# Patient Record
Sex: FEMALE | Race: OTHER | HISPANIC OR LATINO | Employment: FULL TIME | URBAN - METROPOLITAN AREA
[De-identification: names, ages, dates, MRNs, and addresses within clinical notes are randomized per-mention and may not be internally consistent; named-entity substitution may affect disease eponyms.]

---

## 2023-10-19 ENCOUNTER — HOSPITAL ENCOUNTER (EMERGENCY)
Facility: HOSPITAL | Age: 33
Discharge: HOME | End: 2023-10-20
Attending: EMERGENCY MEDICINE
Payer: COMMERCIAL

## 2023-10-19 DIAGNOSIS — R11.2 NAUSEA AND VOMITING, UNSPECIFIED VOMITING TYPE: Primary | ICD-10-CM

## 2023-10-19 DIAGNOSIS — R11.10 VOMITING AND DIARRHEA: ICD-10-CM

## 2023-10-19 DIAGNOSIS — R10.84 GENERALIZED ABDOMINAL PAIN: ICD-10-CM

## 2023-10-19 DIAGNOSIS — R19.7 VOMITING AND DIARRHEA: ICD-10-CM

## 2023-10-19 LAB
ALBUMIN SERPL BCP-MCNC: 4.4 G/DL (ref 3.4–5)
ALP SERPL-CCNC: 69 U/L (ref 33–110)
ALT SERPL W P-5'-P-CCNC: 11 U/L (ref 7–45)
ANION GAP SERPL CALC-SCNC: 12 MMOL/L (ref 10–20)
APPEARANCE UR: ABNORMAL
AST SERPL W P-5'-P-CCNC: 16 U/L (ref 9–39)
BACTERIA #/AREA URNS AUTO: ABNORMAL /HPF
BASOPHILS # BLD AUTO: 0.01 X10*3/UL (ref 0–0.1)
BASOPHILS NFR BLD AUTO: 0.2 %
BILIRUB SERPL-MCNC: 0.9 MG/DL (ref 0–1.2)
BILIRUB UR STRIP.AUTO-MCNC: NEGATIVE MG/DL
BUN SERPL-MCNC: 10 MG/DL (ref 6–23)
CALCIUM SERPL-MCNC: 10.3 MG/DL (ref 8.6–10.3)
CHLORIDE SERPL-SCNC: 107 MMOL/L (ref 98–107)
CO2 SERPL-SCNC: 21 MMOL/L (ref 21–32)
COLOR UR: YELLOW
CREAT SERPL-MCNC: 0.63 MG/DL (ref 0.5–1.05)
EOSINOPHIL # BLD AUTO: 0 X10*3/UL (ref 0–0.7)
EOSINOPHIL NFR BLD AUTO: 0 %
ERYTHROCYTE [DISTWIDTH] IN BLOOD BY AUTOMATED COUNT: 11.6 % (ref 11.5–14.5)
GFR SERPL CREATININE-BSD FRML MDRD: >90 ML/MIN/1.73M*2
GLUCOSE SERPL-MCNC: 112 MG/DL (ref 74–99)
GLUCOSE UR STRIP.AUTO-MCNC: NEGATIVE MG/DL
HCT VFR BLD AUTO: 40.8 % (ref 36–46)
HGB BLD-MCNC: 14.7 G/DL (ref 12–16)
IMM GRANULOCYTES # BLD AUTO: 0.02 X10*3/UL (ref 0–0.7)
IMM GRANULOCYTES NFR BLD AUTO: 0.3 % (ref 0–0.9)
KETONES UR STRIP.AUTO-MCNC: ABNORMAL MG/DL
LEUKOCYTE ESTERASE UR QL STRIP.AUTO: NEGATIVE
LIPASE SERPL-CCNC: 6 U/L (ref 9–82)
LYMPHOCYTES # BLD AUTO: 0.6 X10*3/UL (ref 1.2–4.8)
LYMPHOCYTES NFR BLD AUTO: 10.1 %
MCH RBC QN AUTO: 32.9 PG (ref 26–34)
MCHC RBC AUTO-ENTMCNC: 36 G/DL (ref 32–36)
MCV RBC AUTO: 91 FL (ref 80–100)
MONOCYTES # BLD AUTO: 0.25 X10*3/UL (ref 0.1–1)
MONOCYTES NFR BLD AUTO: 4.2 %
MUCOUS THREADS #/AREA URNS AUTO: ABNORMAL /LPF
NEUTROPHILS # BLD AUTO: 5.07 X10*3/UL (ref 1.2–7.7)
NEUTROPHILS NFR BLD AUTO: 85.2 %
NITRITE UR QL STRIP.AUTO: NEGATIVE
NRBC BLD-RTO: 0 /100 WBCS (ref 0–0)
PH UR STRIP.AUTO: 7 [PH]
PLATELET # BLD AUTO: 328 X10*3/UL (ref 150–450)
PMV BLD AUTO: 9.9 FL (ref 7.5–11.5)
POTASSIUM SERPL-SCNC: 4.1 MMOL/L (ref 3.5–5.3)
PROT SERPL-MCNC: 8.2 G/DL (ref 6.4–8.2)
PROT UR STRIP.AUTO-MCNC: ABNORMAL MG/DL
RBC # BLD AUTO: 4.47 X10*6/UL (ref 4–5.2)
RBC # UR STRIP.AUTO: NEGATIVE /UL
RBC #/AREA URNS AUTO: ABNORMAL /HPF
SODIUM SERPL-SCNC: 136 MMOL/L (ref 136–145)
SP GR UR STRIP.AUTO: 1.02
SQUAMOUS #/AREA URNS AUTO: ABNORMAL /HPF
UROBILINOGEN UR STRIP.AUTO-MCNC: 4 MG/DL
WBC # BLD AUTO: 6 X10*3/UL (ref 4.4–11.3)
WBC #/AREA URNS AUTO: ABNORMAL /HPF

## 2023-10-19 PROCEDURE — 81001 URINALYSIS AUTO W/SCOPE: CPT | Performed by: EMERGENCY MEDICINE

## 2023-10-19 PROCEDURE — 83690 ASSAY OF LIPASE: CPT | Performed by: EMERGENCY MEDICINE

## 2023-10-19 PROCEDURE — 85025 COMPLETE CBC W/AUTO DIFF WBC: CPT | Performed by: EMERGENCY MEDICINE

## 2023-10-19 PROCEDURE — 96374 THER/PROPH/DIAG INJ IV PUSH: CPT

## 2023-10-19 PROCEDURE — 36415 COLL VENOUS BLD VENIPUNCTURE: CPT | Performed by: EMERGENCY MEDICINE

## 2023-10-19 PROCEDURE — 2500000004 HC RX 250 GENERAL PHARMACY W/ HCPCS (ALT 636 FOR OP/ED): Performed by: EMERGENCY MEDICINE

## 2023-10-19 PROCEDURE — 80053 COMPREHEN METABOLIC PANEL: CPT | Performed by: EMERGENCY MEDICINE

## 2023-10-19 PROCEDURE — 99284 EMERGENCY DEPT VISIT MOD MDM: CPT | Performed by: EMERGENCY MEDICINE

## 2023-10-19 RX ORDER — MORPHINE SULFATE 4 MG/ML
4 INJECTION, SOLUTION INTRAMUSCULAR; INTRAVENOUS ONCE
Status: COMPLETED | OUTPATIENT
Start: 2023-10-19 | End: 2023-10-19

## 2023-10-19 RX ORDER — ONDANSETRON HYDROCHLORIDE 2 MG/ML
4 INJECTION, SOLUTION INTRAVENOUS ONCE
Status: COMPLETED | OUTPATIENT
Start: 2023-10-19 | End: 2023-10-19

## 2023-10-19 RX ADMIN — ONDANSETRON 4 MG: 2 INJECTION INTRAMUSCULAR; INTRAVENOUS at 23:09

## 2023-10-19 ASSESSMENT — COLUMBIA-SUICIDE SEVERITY RATING SCALE - C-SSRS
1. IN THE PAST MONTH, HAVE YOU WISHED YOU WERE DEAD OR WISHED YOU COULD GO TO SLEEP AND NOT WAKE UP?: NO
6. HAVE YOU EVER DONE ANYTHING, STARTED TO DO ANYTHING, OR PREPARED TO DO ANYTHING TO END YOUR LIFE?: NO
2. HAVE YOU ACTUALLY HAD ANY THOUGHTS OF KILLING YOURSELF?: NO

## 2023-10-20 VITALS
BODY MASS INDEX: 31.22 KG/M2 | RESPIRATION RATE: 18 BRPM | SYSTOLIC BLOOD PRESSURE: 133 MMHG | WEIGHT: 159 LBS | TEMPERATURE: 98.5 F | HEIGHT: 60 IN | OXYGEN SATURATION: 96 % | DIASTOLIC BLOOD PRESSURE: 97 MMHG | HEART RATE: 58 BPM

## 2023-10-20 LAB — HOLD SPECIMEN: NORMAL

## 2023-10-20 PROCEDURE — 2500000004 HC RX 250 GENERAL PHARMACY W/ HCPCS (ALT 636 FOR OP/ED): Performed by: EMERGENCY MEDICINE

## 2023-10-20 PROCEDURE — 96375 TX/PRO/DX INJ NEW DRUG ADDON: CPT

## 2023-10-20 PROCEDURE — 96361 HYDRATE IV INFUSION ADD-ON: CPT

## 2023-10-20 PROCEDURE — 2500000004 HC RX 250 GENERAL PHARMACY W/ HCPCS (ALT 636 FOR OP/ED)

## 2023-10-20 PROCEDURE — 96372 THER/PROPH/DIAG INJ SC/IM: CPT

## 2023-10-20 PROCEDURE — 96376 TX/PRO/DX INJ SAME DRUG ADON: CPT

## 2023-10-20 RX ORDER — ONDANSETRON HYDROCHLORIDE 2 MG/ML
4 INJECTION, SOLUTION INTRAVENOUS ONCE
Status: COMPLETED | OUTPATIENT
Start: 2023-10-20 | End: 2023-10-20

## 2023-10-20 RX ORDER — DICYCLOMINE HYDROCHLORIDE 20 MG/1
20 TABLET ORAL 4 TIMES DAILY PRN
Qty: 20 TABLET | Refills: 0 | Status: SHIPPED | OUTPATIENT
Start: 2023-10-20 | End: 2024-04-22 | Stop reason: ALTCHOICE

## 2023-10-20 RX ORDER — ONDANSETRON 4 MG/1
4 TABLET, ORALLY DISINTEGRATING ORAL EVERY 8 HOURS PRN
Qty: 20 TABLET | Refills: 0 | Status: SHIPPED | OUTPATIENT
Start: 2023-10-20 | End: 2023-10-27

## 2023-10-20 RX ORDER — MORPHINE SULFATE 4 MG/ML
INJECTION INTRAVENOUS
Status: COMPLETED
Start: 2023-10-20 | End: 2023-10-20

## 2023-10-20 RX ORDER — DICYCLOMINE HYDROCHLORIDE 10 MG/ML
20 INJECTION INTRAMUSCULAR ONCE
Status: COMPLETED | OUTPATIENT
Start: 2023-10-20 | End: 2023-10-20

## 2023-10-20 RX ADMIN — SODIUM CHLORIDE, POTASSIUM CHLORIDE, SODIUM LACTATE AND CALCIUM CHLORIDE 1000 ML: 600; 310; 30; 20 INJECTION, SOLUTION INTRAVENOUS at 00:21

## 2023-10-20 RX ADMIN — ONDANSETRON 4 MG: 2 INJECTION INTRAMUSCULAR; INTRAVENOUS at 02:18

## 2023-10-20 RX ADMIN — MORPHINE SULFATE 4 MG: 4 INJECTION INTRAVENOUS at 00:17

## 2023-10-20 RX ADMIN — DICYCLOMINE HYDROCHLORIDE 20 MG: 10 INJECTION, SOLUTION INTRAMUSCULAR at 02:18

## 2023-10-20 ASSESSMENT — PAIN SCALES - GENERAL: PAINLEVEL_OUTOF10: 8

## 2023-10-21 ENCOUNTER — HOSPITAL ENCOUNTER (EMERGENCY)
Facility: HOSPITAL | Age: 33
Discharge: HOME | End: 2023-10-21
Attending: EMERGENCY MEDICINE
Payer: COMMERCIAL

## 2023-10-21 ENCOUNTER — HOSPITAL ENCOUNTER (EMERGENCY)
Facility: HOSPITAL | Age: 33
Discharge: HOME | End: 2023-10-21
Payer: COMMERCIAL

## 2023-10-21 VITALS
DIASTOLIC BLOOD PRESSURE: 80 MMHG | HEIGHT: 60 IN | OXYGEN SATURATION: 98 % | RESPIRATION RATE: 20 BRPM | BODY MASS INDEX: 31.41 KG/M2 | WEIGHT: 160 LBS | TEMPERATURE: 98.7 F | HEART RATE: 78 BPM | SYSTOLIC BLOOD PRESSURE: 140 MMHG

## 2023-10-21 VITALS
TEMPERATURE: 97.6 F | DIASTOLIC BLOOD PRESSURE: 81 MMHG | SYSTOLIC BLOOD PRESSURE: 150 MMHG | BODY MASS INDEX: 30.43 KG/M2 | WEIGHT: 155 LBS | HEART RATE: 82 BPM | RESPIRATION RATE: 20 BRPM | OXYGEN SATURATION: 100 % | HEIGHT: 60 IN

## 2023-10-21 DIAGNOSIS — A08.4 VIRAL GASTROENTERITIS: Primary | ICD-10-CM

## 2023-10-21 LAB
ALBUMIN SERPL BCP-MCNC: 4.6 G/DL (ref 3.4–5)
ALP SERPL-CCNC: 65 U/L (ref 33–110)
ALT SERPL W P-5'-P-CCNC: 13 U/L (ref 7–45)
ANION GAP SERPL CALC-SCNC: 15 MMOL/L (ref 10–20)
APPEARANCE UR: ABNORMAL
AST SERPL W P-5'-P-CCNC: 20 U/L (ref 9–39)
BASOPHILS # BLD AUTO: 0.01 X10*3/UL (ref 0–0.1)
BASOPHILS NFR BLD AUTO: 0.1 %
BILIRUB SERPL-MCNC: 1.1 MG/DL (ref 0–1.2)
BILIRUB UR STRIP.AUTO-MCNC: ABNORMAL MG/DL
BUN SERPL-MCNC: 16 MG/DL (ref 6–23)
CALCIUM SERPL-MCNC: 9.9 MG/DL (ref 8.6–10.3)
CHLORIDE SERPL-SCNC: 106 MMOL/L (ref 98–107)
CO2 SERPL-SCNC: 21 MMOL/L (ref 21–32)
COLOR UR: ABNORMAL
CREAT SERPL-MCNC: 0.74 MG/DL (ref 0.5–1.05)
EOSINOPHIL # BLD AUTO: 0 X10*3/UL (ref 0–0.7)
EOSINOPHIL NFR BLD AUTO: 0 %
ERYTHROCYTE [DISTWIDTH] IN BLOOD BY AUTOMATED COUNT: 11.7 % (ref 11.5–14.5)
GFR SERPL CREATININE-BSD FRML MDRD: >90 ML/MIN/1.73M*2
GLUCOSE SERPL-MCNC: 122 MG/DL (ref 74–99)
GLUCOSE UR STRIP.AUTO-MCNC: NEGATIVE MG/DL
HCG UR QL IA.RAPID: NEGATIVE
HCT VFR BLD AUTO: 41.7 % (ref 36–46)
HGB BLD-MCNC: 15.2 G/DL (ref 12–16)
HOLD SPECIMEN: 293
IMM GRANULOCYTES # BLD AUTO: 0.02 X10*3/UL (ref 0–0.7)
IMM GRANULOCYTES NFR BLD AUTO: 0.2 % (ref 0–0.9)
KETONES UR STRIP.AUTO-MCNC: ABNORMAL MG/DL
LEUKOCYTE ESTERASE UR QL STRIP.AUTO: NEGATIVE
LIPASE SERPL-CCNC: 3 U/L (ref 9–82)
LYMPHOCYTES # BLD AUTO: 0.71 X10*3/UL (ref 1.2–4.8)
LYMPHOCYTES NFR BLD AUTO: 8.5 %
MCH RBC QN AUTO: 32.5 PG (ref 26–34)
MCHC RBC AUTO-ENTMCNC: 36.5 G/DL (ref 32–36)
MCV RBC AUTO: 89 FL (ref 80–100)
MONOCYTES # BLD AUTO: 0.25 X10*3/UL (ref 0.1–1)
MONOCYTES NFR BLD AUTO: 3 %
MUCOUS THREADS #/AREA URNS AUTO: NORMAL /LPF
NEUTROPHILS # BLD AUTO: 7.32 X10*3/UL (ref 1.2–7.7)
NEUTROPHILS NFR BLD AUTO: 88.2 %
NITRITE UR QL STRIP.AUTO: NEGATIVE
NRBC BLD-RTO: 0 /100 WBCS (ref 0–0)
PH UR STRIP.AUTO: 8 [PH]
PLATELET # BLD AUTO: 364 X10*3/UL (ref 150–450)
PMV BLD AUTO: 10.1 FL (ref 7.5–11.5)
POTASSIUM SERPL-SCNC: 3.6 MMOL/L (ref 3.5–5.3)
PROT SERPL-MCNC: 8.6 G/DL (ref 6.4–8.2)
PROT UR STRIP.AUTO-MCNC: ABNORMAL MG/DL
RBC # BLD AUTO: 4.67 X10*6/UL (ref 4–5.2)
RBC # UR STRIP.AUTO: NEGATIVE /UL
RBC #/AREA URNS AUTO: NORMAL /HPF
SODIUM SERPL-SCNC: 138 MMOL/L (ref 136–145)
SP GR UR STRIP.AUTO: 1.03
SQUAMOUS #/AREA URNS AUTO: NORMAL /HPF
UROBILINOGEN UR STRIP.AUTO-MCNC: 4 MG/DL
WBC # BLD AUTO: 8.3 X10*3/UL (ref 4.4–11.3)
WBC #/AREA URNS AUTO: NORMAL /HPF

## 2023-10-21 PROCEDURE — 83690 ASSAY OF LIPASE: CPT | Performed by: EMERGENCY MEDICINE

## 2023-10-21 PROCEDURE — 99281 EMR DPT VST MAYX REQ PHY/QHP: CPT

## 2023-10-21 PROCEDURE — 96374 THER/PROPH/DIAG INJ IV PUSH: CPT

## 2023-10-21 PROCEDURE — 36415 COLL VENOUS BLD VENIPUNCTURE: CPT | Performed by: EMERGENCY MEDICINE

## 2023-10-21 PROCEDURE — 96372 THER/PROPH/DIAG INJ SC/IM: CPT

## 2023-10-21 PROCEDURE — 80053 COMPREHEN METABOLIC PANEL: CPT | Performed by: EMERGENCY MEDICINE

## 2023-10-21 PROCEDURE — 4500999001 HC ED NO CHARGE

## 2023-10-21 PROCEDURE — 2500000004 HC RX 250 GENERAL PHARMACY W/ HCPCS (ALT 636 FOR OP/ED): Performed by: EMERGENCY MEDICINE

## 2023-10-21 PROCEDURE — 2500000001 HC RX 250 WO HCPCS SELF ADMINISTERED DRUGS (ALT 637 FOR MEDICARE OP): Performed by: EMERGENCY MEDICINE

## 2023-10-21 PROCEDURE — 81025 URINE PREGNANCY TEST: CPT | Performed by: EMERGENCY MEDICINE

## 2023-10-21 PROCEDURE — 85025 COMPLETE CBC W/AUTO DIFF WBC: CPT | Performed by: EMERGENCY MEDICINE

## 2023-10-21 PROCEDURE — 99284 EMERGENCY DEPT VISIT MOD MDM: CPT | Performed by: EMERGENCY MEDICINE

## 2023-10-21 PROCEDURE — 81003 URINALYSIS AUTO W/O SCOPE: CPT | Performed by: EMERGENCY MEDICINE

## 2023-10-21 RX ORDER — LIDOCAINE HYDROCHLORIDE 20 MG/ML
1.25 SOLUTION OROPHARYNGEAL ONCE
Status: COMPLETED | OUTPATIENT
Start: 2023-10-21 | End: 2023-10-21

## 2023-10-21 RX ORDER — ONDANSETRON HYDROCHLORIDE 2 MG/ML
4 INJECTION, SOLUTION INTRAVENOUS ONCE
Status: COMPLETED | OUTPATIENT
Start: 2023-10-21 | End: 2023-10-21

## 2023-10-21 RX ORDER — DICYCLOMINE HYDROCHLORIDE 10 MG/ML
20 INJECTION INTRAMUSCULAR ONCE
Status: COMPLETED | OUTPATIENT
Start: 2023-10-21 | End: 2023-10-21

## 2023-10-21 RX ADMIN — SODIUM CHLORIDE, POTASSIUM CHLORIDE, SODIUM LACTATE AND CALCIUM CHLORIDE 1000 ML: 600; 310; 30; 20 INJECTION, SOLUTION INTRAVENOUS at 08:55

## 2023-10-21 RX ADMIN — LIDOCAINE HYDROCHLORIDE 1.25 ML: 20 SOLUTION ORAL at 08:55

## 2023-10-21 RX ADMIN — DICYCLOMINE HYDROCHLORIDE 20 MG: 10 INJECTION, SOLUTION INTRAMUSCULAR at 08:56

## 2023-10-21 RX ADMIN — ONDANSETRON 4 MG: 2 INJECTION INTRAMUSCULAR; INTRAVENOUS at 08:57

## 2023-10-21 ASSESSMENT — PAIN DESCRIPTION - DESCRIPTORS
DESCRIPTORS: DULL
DESCRIPTORS: SHARP;PRESSURE;SHOOTING
DESCRIPTORS: DULL
DESCRIPTORS: SHARP;SHOOTING

## 2023-10-21 ASSESSMENT — COLUMBIA-SUICIDE SEVERITY RATING SCALE - C-SSRS
1. IN THE PAST MONTH, HAVE YOU WISHED YOU WERE DEAD OR WISHED YOU COULD GO TO SLEEP AND NOT WAKE UP?: NO
1. IN THE PAST MONTH, HAVE YOU WISHED YOU WERE DEAD OR WISHED YOU COULD GO TO SLEEP AND NOT WAKE UP?: NO
2. HAVE YOU ACTUALLY HAD ANY THOUGHTS OF KILLING YOURSELF?: NO
6. HAVE YOU EVER DONE ANYTHING, STARTED TO DO ANYTHING, OR PREPARED TO DO ANYTHING TO END YOUR LIFE?: NO
2. HAVE YOU ACTUALLY HAD ANY THOUGHTS OF KILLING YOURSELF?: NO
6. HAVE YOU EVER DONE ANYTHING, STARTED TO DO ANYTHING, OR PREPARED TO DO ANYTHING TO END YOUR LIFE?: NO

## 2023-10-21 ASSESSMENT — PAIN - FUNCTIONAL ASSESSMENT
PAIN_FUNCTIONAL_ASSESSMENT: 0-10
PAIN_FUNCTIONAL_ASSESSMENT: 0-10

## 2023-10-21 ASSESSMENT — PAIN DESCRIPTION - ORIENTATION: ORIENTATION: UPPER

## 2023-10-21 ASSESSMENT — LIFESTYLE VARIABLES
HAVE YOU EVER FELT YOU SHOULD CUT DOWN ON YOUR DRINKING: NO
EVER HAD A DRINK FIRST THING IN THE MORNING TO STEADY YOUR NERVES TO GET RID OF A HANGOVER: NO
EVER FELT BAD OR GUILTY ABOUT YOUR DRINKING: NO
REASON UNABLE TO ASSESS: NO
HAVE PEOPLE ANNOYED YOU BY CRITICIZING YOUR DRINKING: NO

## 2023-10-21 ASSESSMENT — PAIN DESCRIPTION - FREQUENCY: FREQUENCY: CONSTANT/CONTINUOUS

## 2023-10-21 ASSESSMENT — PAIN DESCRIPTION - LOCATION
LOCATION: ABDOMEN
LOCATION: CHEST

## 2023-10-21 ASSESSMENT — PAIN DESCRIPTION - PAIN TYPE
TYPE: ACUTE PAIN
TYPE: ACUTE PAIN

## 2023-10-21 ASSESSMENT — PAIN SCALES - GENERAL
PAINLEVEL_OUTOF10: 9
PAINLEVEL_OUTOF10: 10 - WORST POSSIBLE PAIN

## 2023-10-21 NOTE — ED PROVIDER NOTES
HPI   Chief Complaint   Patient presents with   • Abdominal Pain     Epigastric dull pain x2 days. Seen here over past 24 hours and dx with GI virus. Sent home with scripts which patient reports are not helping. + Emesis.        Patient presents to the ED on 10/21 with abdominal pain and vomiting after leaving the ED on 10/19 for similar symptoms. Symptoms on 10/19 were relieved by Zofran and Bentyl for 16 hours, however, returned last night. Patient's daughter and son had similar symptoms 1 & 2 weeks ago. Patient reports 2 episodes of watery diarrhea yesterday morning followed by an inability to pass gas as well as 15 episodes of vomiting that she reports seeing blood in. Tenderness present over the epigastrium. Denies blood in urine or bowel movements, chest pain, and fever.                           Marc Coma Scale Score: 15                  Patient History   No past medical history on file.  Past Surgical History:   Procedure Laterality Date   • OTHER SURGICAL HISTORY  2021     section   • OTHER SURGICAL HISTORY  2021    Breast biopsy   • OTHER SURGICAL HISTORY  2021    [place] lymph node biopsy procedure   • OTHER SURGICAL HISTORY  2021    Lumpectomy     No family history on file.  Social History     Tobacco Use   • Smoking status: Not on file   • Smokeless tobacco: Not on file   Substance Use Topics   • Alcohol use: Not on file   • Drug use: Not on file       Physical Exam   ED Triage Vitals   Temp Heart Rate Resp BP   10/21/23 0523 10/21/23 0523 10/21/23 0523 10/21/23 0523   37.1 °C (98.7 °F) 70 16 137/88      SpO2 Temp Source Heart Rate Source Patient Position   10/21/23 0523 10/21/23 0523 10/21/23 0523 10/21/23 0732   98 % Temporal Monitor Sitting      BP Location FiO2 (%)     10/21/23 0732 --     Left arm        Physical Exam  Vitals and nursing note reviewed.   Constitutional:       General: She is not in acute distress.     Appearance: She is well-developed. She is  ill-appearing.      Comments: Patient curled up on side due to abdominal cramping   HENT:      Head: Normocephalic and atraumatic.      Right Ear: External ear normal.      Left Ear: External ear normal.      Nose: Nose normal.      Mouth/Throat:      Mouth: Mucous membranes are moist.      Pharynx: Oropharynx is clear.   Eyes:      Extraocular Movements: Extraocular movements intact.      Conjunctiva/sclera: Conjunctivae normal.      Pupils: Pupils are equal, round, and reactive to light.   Neck:      Comments: Trachea midline  Cardiovascular:      Rate and Rhythm: Normal rate and regular rhythm.      Pulses: Normal pulses.   Pulmonary:      Effort: Pulmonary effort is normal. No respiratory distress.      Breath sounds: Normal breath sounds.   Abdominal:      General: Bowel sounds are decreased. There is no distension. There are no signs of injury.      Palpations: Abdomen is soft.      Tenderness: There is abdominal tenderness in the right upper quadrant, epigastric area and left upper quadrant. There is no right CVA tenderness, left CVA tenderness or guarding. Negative signs include Bowen's sign and McBurney's sign.      Comments: Greater discomfort on palpation of RUQ than LUQ.   Musculoskeletal:         General: No swelling or tenderness.      Cervical back: No tenderness.   Skin:     General: Skin is warm and dry.      Capillary Refill: Capillary refill takes less than 2 seconds.   Neurological:      General: No focal deficit present.      Mental Status: She is alert and oriented to person, place, and time.   Psychiatric:         Mood and Affect: Mood normal.         Thought Content: Thought content does not include homicidal or suicidal ideation.         ED Course & MDM   Diagnoses as of 10/21/23 1024   Viral gastroenteritis       Medical Decision Making  Patient presents with abdominal pain and vomiting. Available chart reviewed. On initial assessment the patient was found non-toxic, no acute distress,  vitals hemodynamically stable and afebrile. Initial concern for viral gastroenteritis, peptic ulcer disease, small bowel obstruction, pregnancy, and atypical angina. Basic lab work was reviewed and non-significant.  Given history with families with similar symptoms's patient likely has viral gastroenteritis.  Given IV Zofran, IM Bentyl, and viscous Lidocaine after discussion with patient. CBC shows no leukocytosis, no anemia, no thrombocytopenia.  Metabolic panel shows no electrode abnormalities, normal kidney and liver function.  Urinalysis not concerning for infection.  Patient pregnant by urine.  Lipase normal at 3.  Patient feels better after treatment here in the emergency department.  Patient has medications at home.  Work note will be provided.    No indication for admission.  Discussed findings and diagnosis with the patient, follow-up and return to ED precautions given, patient voiced understanding, agrees with plan, questions answered, patient was discharged in stable condition.        Procedure  Procedures     Rusty Bee MD  10/21/23 1024

## 2023-10-21 NOTE — Clinical Note
Susanne Dubon was seen and treated in our emergency department on 10/21/2023.  She may return to work on 10/25/2023.  ?     If you have any questions or concerns, please don't hesitate to call.      Rusty Bee MD

## 2023-10-22 NOTE — ED PROVIDER NOTES
HPI   Chief Complaint   Patient presents with    Vomiting    Back Pain     Lower back pain that travels into rib cage bilaterally, started at 4am, pt states her kids just got over a gi bug    Diarrhea    Chills       Susanne Dubon is a 33 y.o. patient presenting to the ED for abdominal pain, nausea, vomiting, diarrhea.  The patient's symptoms have been persistent throughout the day today.  Her children have had similar symptoms recently.  The patient denies any known fever.  Her abdominal pain is diffuse and cramping in nature.    Additional History Obtained from: none  Limitations to History: none  ------------------------------------------------------------------------------------------------------------------------------------------  Physical Exam:  Appearance: Alert, cooperative,   Skin: Warm, dry, appropriate color for ethnicity.  Eyes: Cornea clear. No scleral icterus or injection.   ENT: Mucous membranes moist.  Pulmonary: No accessory muscle use or stridor. Clear lung sounds bilaterally without rhonchi or wheezing.   Cardiac: Heart sounds regular without murmur. B/L radial pulses full and symmetric.   Abdomen: Soft, mild, diffuse tenderness.  No rebound or guarding.   Musculoskeletal: No gross deformities.   Neurological: Face symmetrical. Voice clear. Appropriately conversant.   Psychiatric: Appropriate mood and affect.                              Nixon Coma Scale Score: 15                  Patient History   No past medical history on file.  Past Surgical History:   Procedure Laterality Date    OTHER SURGICAL HISTORY  2021     section    OTHER SURGICAL HISTORY  2021    Breast biopsy    OTHER SURGICAL HISTORY  2021    [place] lymph node biopsy procedure    OTHER SURGICAL HISTORY  2021    Lumpectomy     No family history on file.  Social History     Tobacco Use    Smoking status: Not on file    Smokeless tobacco: Not on file   Substance Use Topics    Alcohol use: Not on  file    Drug use: Not on file       Physical Exam   ED Triage Vitals   Temp Heart Rate Resp BP   10/19/23 2121 10/19/23 2121 10/19/23 2121 10/19/23 2121   36.9 °C (98.5 °F) 74 18 (!) 145/100      SpO2 Temp src Heart Rate Source Patient Position   10/19/23 2121 -- 10/20/23 0130 10/20/23 0030   97 %  Monitor Sitting      BP Location FiO2 (%)     10/20/23 0030 --     Right arm        Physical Exam    ED Course & MDM   ED Course as of 10/22/23 0606   Sat Oct 21, 2023   0055 EKG performed at 0052 shows sinus rhythm at a rate of 65.  Intervals are normal.  The axis is normal.  There are no ST elevations or depressions.  No T wave changes.  No STEMI. [SP]   0400 CBC, CMP, lipase are within normal limits.  Urinalysis is negative for evidence of infection. [SP]      ED Course User Index  [SP] Eduarda Stephens DO         Diagnoses as of 10/22/23 0606   Nausea and vomiting, unspecified vomiting type   Vomiting and diarrhea   Generalized abdominal pain       Medical Decision Making  Medical Decision Making:  Chronic Medical Conditions Significantly Affecting Care: denies    Social Determinants of Health Significantly Affecting Care: none identified    Differential Diagnosis Considered but not limited to: Dehydration, electrolyte derangement, gastroenteritis, less likely appendicitis, diverticulitis, urinary tract infection      External Records Reviewed:   I reviewed recent and relevant outside records including:     Independent Interpretation of Studies: The following studies were ordered as part of the emergency department work up and independently interpreted by me. See ED Course for details.      Escalation of Care:  Patient was treated symptomatically with IV fluids, Zofran, morphine, and Bentyl.  She did feel better after this.  She was able to tolerate p.o.    At this time I do believe the patient to be stable for outpatient management.  Test results were discussed with the patient.  Follow-up and return precautions were also  given.  Patient is encouraged to return to the emergency department should they develop any new or worsening symptoms.            Procedure  Procedures     Eduarda Stephens DO  10/22/23 0607

## 2023-10-25 ENCOUNTER — HOSPITAL ENCOUNTER (OUTPATIENT)
Dept: CARDIOLOGY | Facility: HOSPITAL | Age: 33
Discharge: HOME | End: 2023-10-25
Payer: COMMERCIAL

## 2023-10-25 PROCEDURE — 93005 ELECTROCARDIOGRAM TRACING: CPT

## 2024-03-19 ENCOUNTER — HOSPITAL ENCOUNTER (OUTPATIENT)
Dept: RADIOLOGY | Facility: HOSPITAL | Age: 34
Discharge: HOME | End: 2024-03-19
Payer: COMMERCIAL

## 2024-03-19 DIAGNOSIS — C50.412 MALIGNANT NEOPLASM OF UPPER-OUTER QUADRANT OF LEFT FEMALE BREAST (MULTI): ICD-10-CM

## 2024-03-19 DIAGNOSIS — Z17.0 ESTROGEN RECEPTOR POSITIVE STATUS (ER+): ICD-10-CM

## 2024-03-19 PROCEDURE — 77067 SCR MAMMO BI INCL CAD: CPT | Mod: BILATERAL PROCEDURE

## 2024-03-19 PROCEDURE — 77067 SCR MAMMO BI INCL CAD: CPT

## 2024-03-19 PROCEDURE — 77063 BREAST TOMOSYNTHESIS BI: CPT | Mod: BILATERAL PROCEDURE

## 2024-04-02 ENCOUNTER — APPOINTMENT (OUTPATIENT)
Dept: SURGERY | Facility: CLINIC | Age: 34
End: 2024-04-02
Payer: COMMERCIAL

## 2024-04-09 ENCOUNTER — OFFICE VISIT (OUTPATIENT)
Dept: SURGERY | Facility: CLINIC | Age: 34
End: 2024-04-09
Payer: COMMERCIAL

## 2024-04-09 VITALS
DIASTOLIC BLOOD PRESSURE: 89 MMHG | WEIGHT: 161.3 LBS | BODY MASS INDEX: 31.67 KG/M2 | HEART RATE: 100 BPM | SYSTOLIC BLOOD PRESSURE: 133 MMHG | HEIGHT: 60 IN | OXYGEN SATURATION: 95 %

## 2024-04-09 DIAGNOSIS — Z12.31 ENCOUNTER FOR SCREENING MAMMOGRAM FOR BREAST CANCER: ICD-10-CM

## 2024-04-09 DIAGNOSIS — R92.30 DENSE BREAST TISSUE: ICD-10-CM

## 2024-04-09 DIAGNOSIS — Z13.71 BRCA NEGATIVE: ICD-10-CM

## 2024-04-09 DIAGNOSIS — I89.0 LYMPHEDEMA OF BREAST: ICD-10-CM

## 2024-04-09 DIAGNOSIS — Z17.0 MALIGNANT NEOPLASM OF UPPER-OUTER QUADRANT OF LEFT BREAST IN FEMALE, ESTROGEN RECEPTOR POSITIVE (MULTI): Primary | ICD-10-CM

## 2024-04-09 DIAGNOSIS — C50.412 MALIGNANT NEOPLASM OF UPPER-OUTER QUADRANT OF LEFT BREAST IN FEMALE, ESTROGEN RECEPTOR POSITIVE (MULTI): Primary | ICD-10-CM

## 2024-04-09 PROCEDURE — 99213 OFFICE O/P EST LOW 20 MIN: CPT | Performed by: SURGERY

## 2024-04-09 NOTE — PROGRESS NOTES
GENERAL SURGERY OFFICE NOTE    Patient: Susanne Dubon    Age: 34 y.o.   Gender: female    MRN: [id number]    PCP: No Assigned PCP Generic Provider, MD        SUBJECTIVE     Chief Complaint  Follow-up (Patient is here for 1 year breast follow up. Patient states no new issues. )       LAUREN Skinner returns to the office for a 3 - year checkup for a left breast T1N0 infiltrating ductal carcinoma.  She had experienced significant left breast lymphedema after her radiation treatment.  She was referred to a lymphedema specialist, but she never made the appointment.  She does do massage therapy with lotion to the left breast.  She feels that her edema has improved, although it has not completely resolved.  She notes that in the wintertime, she gets significant dry skin and has been using Goldbond lotion regularly.  She currently denies any new breast masses, skin changes or nipple discharge.  Recent bilateral mammogram shows no radiographic concern for malignancy.  Although she had originally declined tamoxifen treatment, she would like to rediscuss possible tamoxifen treatment.     Original risk factors for breast cancer: 30-year-old [place]; menarche at age 13; first live birth at age 19; no previous breast biopsy; mother diagnosed with breast cancer in her mid 50s (DCIS); no history of birth control or hormone replacement therapy. This gives her a 5-year Margy score of 0.3% and a lifetime risk of 9.5% which overall is average risk. Using the Tyrer-Cuzick Breast cancer risk evaluation tool, this patient's 10-year risk of breast cancer is 0.6% ( average risk is 0.4%) and lifetime risk is 15.5% (average lifetime risk is 11.2%). This puts her in a higher than average risk category for developing breast cancer.     ROS  Review of Systems   Constitutional: no fever, no chills, no recent weight gain and no recent weight loss.   Eyes: no eye symptoms, but no loss of vision, no discharge from the eyes, no itching of the  eyes and no eye pain.   ENT: no hearing loss, no neck pain and no hoarseness.   Cardiovascular: no chest pain, no palpitations and no lower extremity edema.   Respiratory: no dyspnea, no dyspnea during exertion and no cough.   Breast: pain in breast and change in breast skin, but no nipple discharge, no breast mass, no erythema and no axillary adenopathy.   Gastrointestinal: no abdominal pain, no constipation, no heartburn, no vomiting, no blood in stools, bowel movement frequency normal.   Genitourinary: no dysuria and no hematuria.   Musculoskeletal: no arthralgias, no myalgias and no hernia.   Integumentary: no rashes and no skin lesions.   Neurological: no headache, no dizziness, no numbness, no tingling and no limb weakness.   Psychiatric: no anxiety, no depression and no emotional problems.   Endocrine: no heat or cold intolerance and no increased thirst.   Hematologic/Lymphatic: no tendency for easy bleeding and no tendency for easy bruising.   All other systems have been reviewed and are negative for complaint.     HISTORY   No past medical history on file.     Past Surgical History:   Procedure Laterality Date    OTHER SURGICAL HISTORY  2021     section    OTHER SURGICAL HISTORY Right 2021    Breast biopsy    OTHER SURGICAL HISTORY  2021    [place] lymph node biopsy procedure    OTHER SURGICAL HISTORY Left 2021    Lumpectomy        Family History   Problem Relation Name Age of Onset    Breast cancer Mother          Allergies   Allergen Reactions    Ibuprofen Hives        Social History     Tobacco Use   Smoking Status Not on file   Smokeless Tobacco Not on file        Social History     Substance and Sexual Activity   Alcohol Use Not on file        HOME MEDICATIONS  Current Outpatient Medications   Medication Instructions    dicyclomine (BENTYL) 20 mg, oral, 4 times daily PRN          OBJECTIVE   Last Recorded Vitals.  Blood pressure 133/89, pulse 100, height 1.524 m (5'),  weight 73.2 kg (161 lb 4.8 oz), SpO2 95 %.     PHYSICAL EXAM  Physical Exam   General: Well-developed, well-nourished and in no acute distress. Short stature.  Head: Normocephalic. Atraumatic.  Neck/thyroid: Neck is supple. Normal thyroid without mass. No jugular venous distention.  Eyes: Pupils equal round and reactive to light. Conjunctiva normal.  ENMT: No masses or deformity of external nose. External ears without masses.  Respiratory/Chest: Normal respiratory effort.  Breast: Moderate sized breast. No abnormalities of the right breast. The left breast is significantly swollen with the majority of the swelling in the central aspect. No erythema, but there are skin changes consistent with XRT treatment. The lateral breast incision and right axillary incision are both healing well. No drainage, and no palpable mass.  Lymphatics: No palpable lymphadenopathy of the cervical, supraclavicular or axillary regions.  Cardiovascular: Regular rate and rhythm.   Abdomen: Soft, nontender, nondistended. No hernias.   Musculoskeletal: Joints and limbs are grossly normal. Normal gait. Normal range of motion of major joints.  Neuro: Oriented to person, place and time. No obvious neurological deficit. Motor strength grossly normal.  Psych: Normal mood and affect.     RESULTS   Labs  No results found for this or any previous visit (from the past 24 hour(s)).    Radiology [facility]  MAMMO BILATERAL SCREENING TOMOSYNTHESIS;  3/19/2024 3:01 pm      ACCESSION NUMBER(S):  [id number]      ORDERING CLINICIAN:  CYNDEE MADSEN      INDICATION:  Screening.          COMPARISON:  03/07/2023, 09/06/2022, 02/24/2022      FINDINGS:  2D and tomosynthesis images were reviewed at 1 mm slice thickness.      Density:  The breast tissue is extremely dense, which may limit the  sensitivity of mammography.      Skin thickening in the left breast is similar to prior studies,  sequela of radiation. Left-sided postsurgical scars are also  unchanged. No  suspicious masses or calcifications are identified.      IMPRESSION:  No mammographic evidence of malignancy.      BI-RADS CATEGORY:      BI-RADS Category:  2 Benign.  Recommendation:  Annual Screening.  Recommended Date:  1 Year.  Laterality:  Bilateral.      ASSESSMENT / PLAN   Diagnoses and all orders for this visit:  Malignant neoplasm of upper-outer quadrant of left breast in female, estrogen receptor positive (CMS/HCC)  -     Referral to [facility] (Incoming); Future  BRCA negative  Dense breast tissue  Lymphedema of breast  Encounter for screening mammogram for breast cancer  -     BI mammo bilateral screening tomosynthesis; Future      Plan  March 2021: LEFT - Stage Ia, pT1c pN0 cM0; MammaPrint low risks, 0.130; IDC left breast; s/p lumpectomy with SLNB (6 negative nodes); completed XRT; took 1 month of Tamoxifen (stopped Oct 2021); Tamoxifen called in Jan 2022, but not not able to tolerate side effects; refusing further endocrine therapy, but now would like to rediscuss possible tamoxifen usage.     1. The skin changes to the left breast are consistent with XRT changes. She followed up with radiation oncology, but no further intervention or recommendations were made. I referred her to the lymphedema specialist PT/OT to see if massage therapy can assist with drainage of the breast tissue, but she was unable to make an appointment.  However, she has been doing her own massage therapy and she feels that the lymphedema is somewhat improved.  2. Her genetic testing showed BRCA negative. Her sister also tested negative.  3. Clinically no evidence of recurrent cancer by physical exam or radiographic evaluation.  4. She was only able to tolerate 1 month of her tamoxifen. She was given a new prescription for Toremifene, but nevert started this medication as she is concerned about some of the side effects. She does have a follow-up appointment with oncology in a few months. Taking the endocrine therapy for  her ER/GA positive breast cancer was recommended. She noted that her mother is 12 years status post diagnosis of breast cancer without any evidence of recurrence, and she did not take her endocrine therapy either. She stated that she does understand the increased risk of recurrent cancer without this treatment.  She has reconsidered her position on endocrine therapy, and would like to discuss possible tamoxifen usage.  She is willing to do a virtual appointment with Dr. Ritter.  Referral made.  5. She is 3 years out without evidence of recurrent cancer. We will continue to monitor with yearly mammograms and physical exam. Return to the office after next mammogram.       Vicky Roach MD, FACS  [facility]  [address] [place] [address];   [address]  [place]  [address] [phone]

## 2024-04-09 NOTE — PATIENT INSTRUCTIONS
1. Continue doing your monthly self breast exams. If you identify any abnormalities, please call Dr. Roach's office immediately for evaluation. [phone]  2.  Continue with your yearly screening mammograms.  Next 1 due in 1 year.  Follow-up in Dr. Roach's office after your mammogram.  3.  Referral has been made to have a virtual appointment with Dr. Ritter (medical oncology) to rediscuss possible endocrine/tamoxifen therapy for your breast cancer.  4.  Continue doing your massage therapy to your left breast to help with the swelling.

## 2024-04-17 ENCOUNTER — TELEMEDICINE (OUTPATIENT)
Dept: HEMATOLOGY/ONCOLOGY | Facility: CLINIC | Age: 34
End: 2024-04-17
Payer: COMMERCIAL

## 2024-04-17 DIAGNOSIS — Z17.0 MALIGNANT NEOPLASM OF UPPER-OUTER QUADRANT OF LEFT BREAST IN FEMALE, ESTROGEN RECEPTOR POSITIVE (MULTI): ICD-10-CM

## 2024-04-17 DIAGNOSIS — Z17.0 ER+ (ESTROGEN RECEPTOR POSITIVE STATUS): ICD-10-CM

## 2024-04-17 DIAGNOSIS — Z17.0 MALIGNANT NEOPLASM OF LOWER-OUTER QUADRANT OF RIGHT BREAST OF FEMALE, ESTROGEN RECEPTOR POSITIVE (MULTI): Primary | ICD-10-CM

## 2024-04-17 DIAGNOSIS — N95.9 PREMENOPAUSAL PATIENT: ICD-10-CM

## 2024-04-17 DIAGNOSIS — C50.412 MALIGNANT NEOPLASM OF UPPER-OUTER QUADRANT OF LEFT BREAST IN FEMALE, ESTROGEN RECEPTOR POSITIVE (MULTI): ICD-10-CM

## 2024-04-17 DIAGNOSIS — C50.511 MALIGNANT NEOPLASM OF LOWER-OUTER QUADRANT OF RIGHT BREAST OF FEMALE, ESTROGEN RECEPTOR POSITIVE (MULTI): Primary | ICD-10-CM

## 2024-04-17 DIAGNOSIS — Z75.8 DOES NOT HAVE PRIMARY CARE PROVIDER: ICD-10-CM

## 2024-04-17 PROCEDURE — 1036F TOBACCO NON-USER: CPT | Performed by: INTERNAL MEDICINE

## 2024-04-17 PROCEDURE — 99214 OFFICE O/P EST MOD 30 MIN: CPT | Performed by: INTERNAL MEDICINE

## 2024-04-17 RX ORDER — TAMOXIFEN CITRATE 20 MG/1
20 TABLET ORAL DAILY
Qty: 30 TABLET | Refills: 2 | Status: SHIPPED | OUTPATIENT
Start: 2024-04-17 | End: 2024-07-16

## 2024-04-17 ASSESSMENT — COLUMBIA-SUICIDE SEVERITY RATING SCALE - C-SSRS
6. HAVE YOU EVER DONE ANYTHING, STARTED TO DO ANYTHING, OR PREPARED TO DO ANYTHING TO END YOUR LIFE?: NO
1. IN THE PAST MONTH, HAVE YOU WISHED YOU WERE DEAD OR WISHED YOU COULD GO TO SLEEP AND NOT WAKE UP?: NO
2. HAVE YOU ACTUALLY HAD ANY THOUGHTS OF KILLING YOURSELF?: NO

## 2024-04-17 ASSESSMENT — ENCOUNTER SYMPTOMS
DEPRESSION: 0
LOSS OF SENSATION IN FEET: 0
OCCASIONAL FEELINGS OF UNSTEADINESS: 0

## 2024-04-17 ASSESSMENT — PATIENT HEALTH QUESTIONNAIRE - PHQ9
2. FEELING DOWN, DEPRESSED OR HOPELESS: NOT AT ALL
SUM OF ALL RESPONSES TO PHQ9 QUESTIONS 1 AND 2: 0
1. LITTLE INTEREST OR PLEASURE IN DOING THINGS: NOT AT ALL

## 2024-04-17 ASSESSMENT — PAIN SCALES - GENERAL: PAINLEVEL: 0-NO PAIN

## 2024-04-22 NOTE — PROGRESS NOTES
DIVISION OF MEDICAL ONCOLOGY    Patient ID: Susanne Dubon is a 34 y.o. female.  MRN: [id number]  : 1990  The patient presents to clinic today for her history of breast cancer.     Cancer Staging   Malignant neoplasm of upper-outer quadrant of left breast in female, estrogen receptor positive (Multi)  Staging form: Breast, AJCC 8th Edition  - Pathologic stage from 2021: Stage IA (pT1c, pN0(sn), cM0, G1, ER+, WV+, HER2-) - Signed by Tacho Ritter MD on 2024    Diagnostic/Therapeutic History:  - Palpable left breast mass.  - 21: Diagnostic mammogram/US demonstrated a 1.8 cm mass-like density at 1:00, 10 cm from the nipple. Indeterminate, but suspicious, left axillary LN's noted.  - 3/15/21: Left breast mass biopsy showed IDC, grade 1. ER >95% WV 20% Her2-negative (0 IHC). Mammaprint assay low-risk (+0.130) luminal A type. Axillary LN's appeared normal on this scan.  - 21: Left lumpectomy and SLNBx performed. 1.7 cm of grade 1 IDC with tubular features was removed. No LVI. Margins negative. 0/4 SLN's involved.  - Completed adjuvant RT 2021.  - Tamoxifen discussed, but declined due to concern for side effects.    History of Present Illness (HPI)/Interval History:  Susanne Dubon presents today for a second opinion regarding endocrine therapy for her breast cancer. Today's visit takes place virtually (audio and video).  Her oncologic history has been summarized in detail above.  Overall, she is feeling quite well.  No new breast-related symptoms.  Recent mammogram with Dr. Roach was benign.  She feels ready to start hormonal therapy.    Review of Systems:  14-point ROS otherwise negative, as per HPI/Interval History.    Past Medical History:   Diagnosis Date    Breast cancer (Multi)      Patient Active Problem List   Diagnosis    Lymphedema of breast    BRCA negative    Dense breast tissue    Malignant neoplasm of upper-outer quadrant of left breast in female, estrogen receptor positive  (Multi)        Past Surgical History:   Procedure Laterality Date    OTHER SURGICAL HISTORY  2021     section    OTHER SURGICAL HISTORY Right 2021    Breast biopsy    OTHER SURGICAL HISTORY  2021    [place] lymph node biopsy procedure    OTHER SURGICAL HISTORY Left 2021    Lumpectomy       Social History     Socioeconomic History    Marital status: Single     Spouse name: None    Number of children: None    Years of education: None    Highest education level: None   Occupational History    None   Tobacco Use    Smoking status: Former     Types: Cigarettes     Passive exposure: Never    Smokeless tobacco: Never   Vaping Use    Vaping status: Never Used   Substance and Sexual Activity    Alcohol use: Not Currently    Drug use: Never    Sexual activity: None   Other Topics Concern    None   Social History Narrative    None     Social Determinants of Health     Financial Resource Strain: Not on file   Food Insecurity: Not on file   Transportation Needs: Not on file   Physical Activity: Not on file   Stress: Not on file   Social Connections: Not on file   Intimate Partner Violence: Not on file   Housing Stability: Not on file       Family History   Problem Relation Name Age of Onset    Breast cancer Mother         Allergies:   Allergies   Allergen Reactions    Ibuprofen Hives         Current Outpatient Medications:     dicyclomine (Bentyl) 20 mg tablet, Take 1 tablet (20 mg) by mouth 4 times a day as needed (abdominal pain). (Patient not taking: Reported on 2024), Disp: 20 tablet, Rfl: 0    tamoxifen (Nolvadex) 20 mg tablet, Take 1 tablet (20 mg total) by mouth once daily.  Take with water or any other nonalcoholic drink with or without food at around the same time(s) every day., Disp: 30 tablet, Rfl: 2     Objective    BSA: There is no height or weight on file to calculate BSA.  There were no vitals taken for this visit.  Wt Readings from Last 3 Encounters:   24 73.2 kg (161 lb  4.8 oz)   10/21/23 72.6 kg (160 lb)   10/19/23 72.1 kg (159 lb)     ECOG Performance Status:  The ECOG performance scale today is ECO- Fully active, able to carry on all pre-disease performance w/o restriction.    Physical Exam  Constitutional:       General: She is not in acute distress.     Appearance: Normal appearance. She is not ill-appearing.   Pulmonary:      Effort: Pulmonary effort is normal. No respiratory distress.   Neurological:      Mental Status: She is alert and oriented to person, place, and time. Mental status is at baseline.   Psychiatric:         Mood and Affect: Mood normal.         Behavior: Behavior normal.         Thought Content: Thought content normal.         Judgment: Judgment normal.         Laboratory Data:  Lab Results   Component Value Date    WBC 8.3 10/21/2023    HGB 15.2 10/21/2023    HCT 41.7 10/21/2023    MCV 89 10/21/2023     10/21/2023       Chemistry    Lab Results   Component Value Date/Time     10/21/2023 0722    K 3.6 10/21/2023 0722     10/21/2023 0722    CO2 21 10/21/2023 0722    BUN 16 10/21/2023 0722    CREATININE 0.74 10/21/2023 0722    Lab Results   Component Value Date/Time    CALCIUM 9.9 10/21/2023 0722    ALKPHOS 65 10/21/2023 0722    AST 20 10/21/2023 0722    ALT 13 10/21/2023 0722    BILITOT 1.1 10/21/2023 0722        Radiology:  STUDY:  BI MAMMO BILATERAL SCREENING TOMOSYNTHESIS;  3/19/2024 3:01 pm      ACCESSION NUMBER(S):  [id number]      ORDERING CLINICIAN:  CYNDEE MADSEN      INDICATION:  Screening.          COMPARISON:  2023, 2022, 2022      FINDINGS:  2D and tomosynthesis images were reviewed at 1 mm slice thickness.      Density:  The breast tissue is extremely dense, which may limit the  sensitivity of mammography.      Skin thickening in the left breast is similar to prior studies,  sequela of radiation. Left-sided postsurgical scars are also  unchanged. No suspicious masses or calcifications are  identified.      IMPRESSION:  No mammographic evidence of malignancy.    Pathology:  Date of Procedure:    3/15/2021  Date Received:          3/15/2021  Date Reported           3/16/2021    FINAL DIAGNOSIS  A.  LEFT BREAST MASS, CORE NEEDLE BIOPSY:  -- INVASIVE DUCTAL CARCINOMA, GRADE 1, SEE NOTE.    ESTROGEN RECEPTOR (CLONE SP1):                                POSITIVE       Percentage with Nuclear Staining: >95%       Intensity of Staining: Strong     PROGESTERONE RECEPTOR (CLONE 1E2):           POSITIVE       Percentage with Nuclear Stainin%       Intensity of Staining: Moderate     HER2 (CLONE 4B5):      NEGATIVE (0)          Assessment/Plan:  Susanne Dubon is a 34 y.o. female with a history of left-sided breast cancer, who presents today for follow-up evaluation and second opinion regarding endocrine therapy.    #Left breast cancer. Stage IA (pT1c pN0 cM0) grade 1 IDC. S/p lumpectomy/SLNBx (2021), adjuvant radiation therapy. No significant endocrine therapy given due to concern for side effects.  - Today, we reviewed the role of adjuvant endocrine therapy in her case. Although it has been 3 years since her surgery, there may still be benefit to endocrine therapy. As she is premenopausal, we discussed tamoxifen as the standard treatment for her. Potential toxicities reviewed, and she is agreeable to proceed. I asked her to call me with any side effects. No personal or family history of VTE.  - Start tamoxifen 20 mg daily. Script sent to her pharmacy.  - Continue yearly mammograms with surgical team.    Disposition.  - RTC ~3 months for virtual visit.  - Referral for primary care placed.  - She was given our contact information and instructed to call with concerns/questions in the interim.    Orders Placed This Encounter   Procedures    Referral to Primary Care      Tacho Ritter MD  Hematology and Medical Oncology  [facility]

## 2024-06-26 ENCOUNTER — TELEMEDICINE (OUTPATIENT)
Dept: HEMATOLOGY/ONCOLOGY | Facility: CLINIC | Age: 34
End: 2024-06-26
Payer: COMMERCIAL

## 2024-06-26 DIAGNOSIS — T45.1X5A HOT FLASHES DUE TO TAMOXIFEN: ICD-10-CM

## 2024-06-26 DIAGNOSIS — C50.412 MALIGNANT NEOPLASM OF UPPER-OUTER QUADRANT OF LEFT BREAST IN FEMALE, ESTROGEN RECEPTOR POSITIVE (MULTI): ICD-10-CM

## 2024-06-26 DIAGNOSIS — R23.2 HOT FLASHES DUE TO TAMOXIFEN: ICD-10-CM

## 2024-06-26 DIAGNOSIS — C50.511 MALIGNANT NEOPLASM OF LOWER-OUTER QUADRANT OF RIGHT BREAST OF FEMALE, ESTROGEN RECEPTOR POSITIVE (MULTI): ICD-10-CM

## 2024-06-26 DIAGNOSIS — Z17.0 MALIGNANT NEOPLASM OF LOWER-OUTER QUADRANT OF RIGHT BREAST OF FEMALE, ESTROGEN RECEPTOR POSITIVE (MULTI): ICD-10-CM

## 2024-06-26 DIAGNOSIS — Z79.810 USE OF TAMOXIFEN (NOLVADEX): Primary | ICD-10-CM

## 2024-06-26 DIAGNOSIS — Z17.0 MALIGNANT NEOPLASM OF UPPER-OUTER QUADRANT OF LEFT BREAST IN FEMALE, ESTROGEN RECEPTOR POSITIVE (MULTI): ICD-10-CM

## 2024-06-26 PROCEDURE — 99442 PR PHYS/QHP TELEPHONE EVALUATION 11-20 MIN: CPT | Performed by: INTERNAL MEDICINE

## 2024-06-26 RX ORDER — TAMOXIFEN CITRATE 20 MG/1
20 TABLET ORAL DAILY
Qty: 90 TABLET | Refills: 3 | Status: SHIPPED | OUTPATIENT
Start: 2024-06-26 | End: 2025-06-26

## 2024-06-30 NOTE — PROGRESS NOTES
DIVISION OF MEDICAL ONCOLOGY    Patient ID: Susanne Dubon is a 34 y.o. female.  MRN: [id number]  : 1990  The patient presents to clinic today for her history of breast cancer.     Cancer Staging   Malignant neoplasm of upper-outer quadrant of left breast in female, estrogen receptor positive (Multi)  Staging form: Breast, AJCC 8th Edition  - Pathologic stage from 2021: Stage IA (pT1c, pN0(sn), cM0, G1, ER+, RI+, HER2-) - Signed by Tacho Ritter MD on 2024    Diagnostic/Therapeutic History:  - Palpable left breast mass.  - 21: Diagnostic mammogram/US demonstrated a 1.8 cm mass-like density at 1:00, 10 cm from the nipple. Indeterminate, but suspicious, left axillary LN's noted.  - 3/15/21: Left breast mass biopsy showed IDC, grade 1. ER >95% RI 20% Her2-negative (0 IHC). Mammaprint assay low-risk (+0.130) luminal A type. Axillary LN's appeared normal on this scan.  - 21: Left lumpectomy and SLNBx performed. 1.7 cm of grade 1 IDC with tubular features was removed. No LVI. Margins negative. 0/4 SLN's involved.  - Completed adjuvant RT 2021.  - Tamoxifen initially discussed, but declined due to concern for side effects.  - 2024: Tamoxifen 20 mg daily initiated.    History of Present Illness (HPI)/Interval History:  Susanne Dubon presents today for telephone encounter (audio only).  Following her last appointment, she initiated tamoxifen.  She is tolerating the medication well overall.  Notes daily hot flashes, but these are short-lived and manageable.  Notes some joint pains. No new focal bone pain, dyspnea, edema, cough.  She had her regular period this month- no new menstrual symptoms.    Review of Systems:  14-point ROS otherwise negative, as per HPI/Interval History.    Past Medical History:   Diagnosis Date    Breast cancer (Multi)      Patient Active Problem List   Diagnosis    Lymphedema of breast    BRCA negative    Dense breast tissue    Malignant neoplasm of upper-outer  quadrant of left breast in female, estrogen receptor positive (Multi)        Past Surgical History:   Procedure Laterality Date    OTHER SURGICAL HISTORY  2021     section    OTHER SURGICAL HISTORY Right 2021    Breast biopsy    OTHER SURGICAL HISTORY  2021    [place] lymph node biopsy procedure    OTHER SURGICAL HISTORY Left 2021    Lumpectomy       Social History     Socioeconomic History    Marital status: Single     Spouse name: Not on file    Number of children: Not on file    Years of education: Not on file    Highest education level: Not on file   Occupational History    Not on file   Tobacco Use    Smoking status: Former     Types: Cigarettes     Passive exposure: Never    Smokeless tobacco: Never   Vaping Use    Vaping status: Never Used   Substance and Sexual Activity    Alcohol use: Not Currently    Drug use: Never    Sexual activity: Not on file   Other Topics Concern    Not on file   Social History Narrative    Not on file     Social Determinants of Health     Financial Resource Strain: Not on file   Food Insecurity: Not on file   Transportation Needs: Not on file   Physical Activity: Not on file   Stress: Not on file   Social Connections: Not on file   Intimate Partner Violence: Not on file   Housing Stability: Not on file       Family History   Problem Relation Name Age of Onset    Breast cancer Mother         Allergies:   Allergies   Allergen Reactions    Ibuprofen Hives         Current Outpatient Medications:     tamoxifen (Nolvadex) 20 mg tablet, Take 1 tablet (20 mg total) by mouth once daily.  Take with water or any other nonalcoholic drink with or without food at around the same time(s) every day., Disp: 90 tablet, Rfl: 3     Objective    BSA: There is no height or weight on file to calculate BSA.  There were no vitals taken for this visit.  Wt Readings from Last 3 Encounters:   24 73.2 kg (161 lb 4.8 oz)   10/21/23 72.6 kg (160 lb)   10/19/23 72.1 kg (159  lb)     ECOG Performance Status:  The ECOG performance scale today is ECO- Fully active, able to carry on all pre-disease performance w/o restriction.    No physical exam for telephone encounter.    Laboratory Data:  Lab Results   Component Value Date    WBC 8.3 10/21/2023    HGB 15.2 10/21/2023    HCT 41.7 10/21/2023    MCV 89 10/21/2023     10/21/2023       Chemistry    Lab Results   Component Value Date/Time     10/21/2023 0722    K 3.6 10/21/2023 0722     10/21/2023 0722    CO2 21 10/21/2023 0722    BUN 16 10/21/2023 0722    CREATININE 0.74 10/21/2023 0722    Lab Results   Component Value Date/Time    CALCIUM 9.9 10/21/2023 0722    ALKPHOS 65 10/21/2023 0722    AST 20 10/21/2023 0722    ALT 13 10/21/2023 0722    BILITOT 1.1 10/21/2023 0722        Radiology:  No new radiology to review.         Assessment/Plan:  Susanne Dubon is a 34 y.o. female with a history of left-sided breast cancer, who presents today for follow-up evaluation on adjuvant tamoxifen.    #Left breast cancer. Stage IA (pT1c pN0 cM0) grade 1 IDC. S/p lumpectomy/SLNBx (2021), adjuvant radiation therapy. Endocrine therapy was initially declined due to concern for side effects. Tamoxifen initiated 2024.  - Continue tamoxifen 20 mg daily, tolerating well.  - Grade 1 hot flashes and arthralgias noted, will monitor for now.  - Continue yearly mammograms with surgical team.    Disposition.  - RTC ~6 months for virtual visit.  - She has our contact information and was instructed to call with concerns/questions in the interim.    Tacho Ritter MD  Hematology and Medical Oncology  [facility]

## 2024-09-10 ENCOUNTER — CLINICAL SUPPORT (OUTPATIENT)
Dept: URGENT CARE | Age: 34
End: 2024-09-10

## 2024-09-10 VITALS
DIASTOLIC BLOOD PRESSURE: 90 MMHG | HEART RATE: 81 BPM | SYSTOLIC BLOOD PRESSURE: 131 MMHG | RESPIRATION RATE: 16 BRPM | TEMPERATURE: 98 F | OXYGEN SATURATION: 98 %

## 2024-09-10 DIAGNOSIS — M77.8 RIGHT WRIST TENDONITIS: Primary | ICD-10-CM

## 2024-09-10 ASSESSMENT — ENCOUNTER SYMPTOMS
JOINT SWELLING: 1
FEVER: 0
NUMBNESS: 0
CHILLS: 0
WEAKNESS: 0

## 2024-09-10 NOTE — ASSESSMENT & PLAN NOTE
-Recommend ice application and voltaren gel.  -Continue to use wrist splint.  -Letter given to excuse from work today

## 2024-09-10 NOTE — PROGRESS NOTES
Subjective   Patient ID: Susanne Dubon is a 34 y.o. female. They present today with a chief complaint of Wrist Pain.    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 Denies any known injury  Relieved by:  Rest, wrist brace  Worsened by:  Gripping, lifting, aleve  Associated symptoms: no fever        Past Medical History  Allergies as of 09/10/2024 - Reviewed 09/10/2024   Allergen Reaction Noted    Motrin [ibuprofen] Hives 10/19/2023       (Not in a hospital admission)       Past Medical History:   Diagnosis Date    Breast cancer (Multi)        Past Surgical History:   Procedure Laterality Date    OTHER SURGICAL HISTORY  2021     section    OTHER SURGICAL HISTORY Right 2021    Breast biopsy    OTHER SURGICAL HISTORY  2021    [place] lymph node biopsy procedure    OTHER SURGICAL HISTORY Left 2021    Lumpectomy        reports that she has quit smoking. Her smoking use included cigarettes. She has never been exposed to tobacco smoke. She has never used smokeless tobacco. She reports that she does not currently use alcohol. She reports that she does not use drugs.    Review of Systems  Review of Systems   Constitutional:  Negative for chills and fever.   Musculoskeletal:  Positive for joint swelling (4th and 3rd finger on right hand).   Neurological:  Negative for weakness and numbness.   Skin: Negative for bruising, redness,                                Objective    Vitals:    09/10/24 0919   BP: 131/90   Pulse: 81   Resp: 16   Temp: 36.7 °C (98 °F)   SpO2: 98%     No LMP recorded.    Physical Exam  Constitutional:       Appearance: Normal appearance.   Cardiovascular:      Rate and Rhythm: Normal rate and regular rhythm.   Pulmonary:      Effort: Pulmonary effort is normal.      Breath sounds:  Normal breath sounds.   Musculoskeletal:      Right wrist: Tenderness (mid dorsal and volar) present. No swelling, deformity, effusion, snuff box tenderness or crepitus. Normal range of motion. Normal pulse.      Right hand: Swelling (3rd and 4th PIP joints without warmth) and tenderness (3rd and 4th PIP) present. Decreased range of motion (decreased flexion of fingers with pain). Normal strength. Normal sensation. Normal capillary refill. Normal pulse.   Neurological:      Mental Status: She is alert.         Procedures    Point of Care Test & Imaging Results from this visit  Results for orders placed or performed during the hospital encounter of 10/21/23   CBC with Differential   Result Value Ref Range    WBC 8.3 4.4 - 11.3 x10*3/uL    nRBC 0.0 0.0 - 0.0 /100 WBCs    RBC 4.67 4.00 - 5.20 x10*6/uL    Hemoglobin 15.2 12.0 - 16.0 g/dL    Hematocrit 41.7 36.0 - 46.0 %    MCV 89 80 - 100 fL    MCH 32.5 26.0 - 34.0 pg    MCHC 36.5 (H) 32.0 - 36.0 g/dL    RDW 11.7 11.5 - 14.5 %    Platelets 364 150 - 450 x10*3/uL    MPV 10.1 7.5 - 11.5 fL    Neutrophils % 88.2 40.0 - 80.0 %    Immature Granulocytes %, Automated 0.2 0.0 - 0.9 %    Lymphocytes % 8.5 13.0 - 44.0 %    Monocytes % 3.0 2.0 - 10.0 %    Eosinophils % 0.0 0.0 - 6.0 %    Basophils % 0.1 0.0 - 2.0 %    Neutrophils Absolute 7.32 1.20 - 7.70 x10*3/uL    Immature Granulocytes Absolute, Automated 0.02 0.00 - 0.70 x10*3/uL    Lymphocytes Absolute 0.71 (L) 1.20 - 4.80 x10*3/uL    Monocytes Absolute 0.25 0.10 - 1.00 x10*3/uL    Eosinophils Absolute 0.00 0.00 - 0.70 x10*3/uL    Basophils Absolute 0.01 0.00 - 0.10 x10*3/uL   Comprehensive Metabolic Panel   Result Value Ref Range    Glucose 122 (H) 74 - 99 mg/dL    Sodium 138 136 - 145 mmol/L    Potassium 3.6 3.5 - 5.3 mmol/L    Chloride 106 98 - 107 mmol/L    Bicarbonate 21 21 - 32 mmol/L    Anion Gap 15 10 - 20 mmol/L    Urea Nitrogen 16 6 - 23 mg/dL    Creatinine 0.74 0.50 - 1.05 mg/dL    eGFR >90 >60 mL/min/1.73m*2     Calcium 9.9 8.6 - 10.3 mg/dL    Albumin 4.6 3.4 - 5.0 g/dL    Alkaline Phosphatase 65 33 - 110 U/L    Total Protein 8.6 (H) 6.4 - 8.2 g/dL    AST 20 9 - 39 U/L    Bilirubin, Total 1.1 0.0 - 1.2 mg/dL    ALT 13 7 - 45 U/L   Lipase   Result Value Ref Range    Lipase 3 (L) 9 - 82 U/L   hCG, Urine, Qualitative   Result Value Ref Range    HCG, Urine NEGATIVE NEGATIVE   Urinalysis with Reflex Microscopic and Culture   Result Value Ref Range    Color, Urine Karen (N) Straw, Yellow    Appearance, Urine Hazy (N) Clear    Specific Gravity, Urine 1.029 1.005 - 1.035    pH, Urine 8.0 5.0, 5.5, 6.0, 6.5, 7.0, 7.5, 8.0    Protein, Urine >=500 (3+) (N) NEGATIVE mg/dL    Glucose, Urine NEGATIVE NEGATIVE mg/dL    Blood, Urine NEGATIVE NEGATIVE    Ketones, Urine 20 (1+) (A) NEGATIVE mg/dL    Bilirubin, Urine SMALL (1+) (A) NEGATIVE    Urobilinogen, Urine 4.0 (N) <2.0 mg/dL    Nitrite, Urine NEGATIVE NEGATIVE    Leukocyte Esterase, Urine NEGATIVE NEGATIVE   Extra Urine Gray Tube   Result Value Ref Range    Extra Tube 293    Urinalysis Microscopic   Result Value Ref Range    WBC, Urine 1-5 1-5, NONE /HPF    RBC, Urine 1-2 NONE, 1-2, 3-5 /HPF    Squamous Epithelial Cells, Urine 1-9 (SPARSE) Reference range not established. /HPF    Mucus, Urine 4+ Reference range not established. /LPF     No results found.    Diagnostic study results (if any) were reviewed by [facility].    Assessment/Plan   Allergies, medications, history, and pertinent labs/EKGs/Imaging reviewed by Pricilla Cabral.     Medical Decision Making  Patient presents for right wrist pain without injury. She has repetitive work that requires lifting. She has suspected tendonitis.   Given no injury, no Xray done during today's visit.    Orders and Diagnoses  Diagnoses and all orders for this visit:  Right wrist tendonitis  -     Referral to Orthopaedic Surgery; Future  -Recommend ice application and voltaren gel.  -Continue to use wrist splint.  -Letter to excuse from work  today provided.    Medical Admin Record      Follow Up Instructions  No follow-ups on file.    Patient disposition: Home    Electronically signed by [facility]  10:07 AM

## 2024-09-10 NOTE — PATIENT INSTRUCTIONS
Continue to use your wrist splint  May use Voltaren gel, available OTC  May apply ice to affected area.

## 2024-12-18 ENCOUNTER — TELEPHONE (OUTPATIENT)
Dept: HEMATOLOGY/ONCOLOGY | Facility: CLINIC | Age: 34
End: 2024-12-18
Payer: COMMERCIAL

## 2025-04-09 ENCOUNTER — HOSPITAL ENCOUNTER (OUTPATIENT)
Dept: RADIOLOGY | Facility: HOSPITAL | Age: 35
Discharge: HOME | End: 2025-04-09
Payer: COMMERCIAL

## 2025-04-09 VITALS — HEIGHT: 60 IN | BODY MASS INDEX: 31.61 KG/M2 | WEIGHT: 161 LBS

## 2025-04-09 DIAGNOSIS — Z12.31 ENCOUNTER FOR SCREENING MAMMOGRAM FOR BREAST CANCER: ICD-10-CM

## 2025-04-09 PROCEDURE — 77067 SCR MAMMO BI INCL CAD: CPT

## 2025-04-09 PROCEDURE — 77067 SCR MAMMO BI INCL CAD: CPT | Performed by: RADIOLOGY

## 2025-04-09 PROCEDURE — 77063 BREAST TOMOSYNTHESIS BI: CPT | Performed by: RADIOLOGY

## 2025-04-16 ENCOUNTER — APPOINTMENT (OUTPATIENT)
Dept: SURGERY | Facility: CLINIC | Age: 35
End: 2025-04-16
Payer: COMMERCIAL

## 2025-04-16 VITALS
HEIGHT: 60 IN | DIASTOLIC BLOOD PRESSURE: 89 MMHG | WEIGHT: 163 LBS | SYSTOLIC BLOOD PRESSURE: 135 MMHG | BODY MASS INDEX: 32 KG/M2 | OXYGEN SATURATION: 98 % | HEART RATE: 90 BPM

## 2025-04-16 DIAGNOSIS — Z12.31 ENCOUNTER FOR SCREENING MAMMOGRAM FOR BREAST CANCER: ICD-10-CM

## 2025-04-16 DIAGNOSIS — Z17.0 MALIGNANT NEOPLASM OF UPPER-OUTER QUADRANT OF LEFT BREAST IN FEMALE, ESTROGEN RECEPTOR POSITIVE: Primary | ICD-10-CM

## 2025-04-16 DIAGNOSIS — C50.412 MALIGNANT NEOPLASM OF UPPER-OUTER QUADRANT OF LEFT BREAST IN FEMALE, ESTROGEN RECEPTOR POSITIVE: Primary | ICD-10-CM

## 2025-04-16 DIAGNOSIS — Z13.71 BRCA NEGATIVE: ICD-10-CM

## 2025-04-16 DIAGNOSIS — R92.30 DENSE BREAST TISSUE: ICD-10-CM

## 2025-04-16 DIAGNOSIS — I89.0 LYMPHEDEMA OF BREAST: ICD-10-CM

## 2025-04-16 PROCEDURE — 3008F BODY MASS INDEX DOCD: CPT | Performed by: SURGERY

## 2025-04-16 PROCEDURE — 99213 OFFICE O/P EST LOW 20 MIN: CPT | Performed by: SURGERY

## 2025-04-16 NOTE — PROGRESS NOTES
GENERAL SURGERY OFFICE NOTE    Patient: Susanne Dubon    Age: 35 y.o.   Gender: female    MRN: [id number]    PCP: No Assigned PCP Generic Provider, MD        SUBJECTIVE     Chief Complaint  Follow-up (Patient is here for a 1 year left breast follow up. Patient states that she has not had any problems or concerns with either breast.)       LAUREN Skinner returns to the office for a 4 - year checkup for a left breast T1N0 infiltrating ductal carcinoma.  She had experienced significant left breast lymphedema after her radiation treatment.  She was referred to a lymphedema specialist, but she never made the appointment.  She does do massage therapy with lotion to the left breast using Goldbond lotion.  She feels that her edema has improved, although it has not completely resolved.  She notes that in the wintertime, she gets significant dry skin and has been using Goldbond lotion regularly.  She currently denies any new breast masses, skin changes or nipple discharge.  Recent bilateral mammogram shows no radiographic concern for malignancy.  She has elected not to take endocrine therapy.     Original risk factors for breast cancer: 30-year-old [place]; menarche at age 13; first live birth at age 19; no previous breast biopsy; mother diagnosed with breast cancer in her mid 50s (DCIS); no history of birth control or hormone replacement therapy. This gives her a 5-year Margy score of 0.3% and a lifetime risk of 9.5% which overall is average risk. Using the Tyrer-Cuzick Breast cancer risk evaluation tool, this patient's 10-year risk of breast cancer is 0.6% ( average risk is 0.4%) and lifetime risk is 15.5% (average lifetime risk is 11.2%). This puts her in a higher than average risk category for developing breast cancer.     ROS  Review of Systems   Constitutional: no fever, no chills, no recent weight gain and no recent weight loss.   Eyes: no eye symptoms, but no loss of vision, no discharge from the eyes, no  itching of the eyes and no eye pain.   ENT: no hearing loss, no neck pain and no hoarseness.   Cardiovascular: no chest pain, no palpitations and no lower extremity edema.   Respiratory: no dyspnea, no dyspnea during exertion and no cough.   Breast: pain in breast and change in breast skin, but no nipple discharge, no breast mass, no erythema and no axillary adenopathy.   Gastrointestinal: no abdominal pain, no constipation, no heartburn, no vomiting, no blood in stools, bowel movement frequency normal.   Genitourinary: no dysuria and no hematuria.   Musculoskeletal: no arthralgias, no myalgias and no hernia.   Integumentary: no rashes and no skin lesions.   Neurological: no headache, no dizziness, no numbness, no tingling and no limb weakness.   Psychiatric: no anxiety, no depression and no emotional problems.   Endocrine: no heat or cold intolerance and no increased thirst.   Hematologic/Lymphatic: no tendency for easy bleeding and no tendency for easy bruising.   All other systems have been reviewed and are negative for complaint.     HISTORY     Past Medical History:   Diagnosis Date    Breast cancer     Personal history of irradiation 2022        Past Surgical History:   Procedure Laterality Date    BREAST BIOPSY Bilateral 2022    core bx right benign    BREAST LUMPECTOMY Left 2022    +ca    OTHER SURGICAL HISTORY  2021     section    OTHER SURGICAL HISTORY Right 2021    Breast biopsy    OTHER SURGICAL HISTORY  2021    [place] lymph node biopsy procedure    OTHER SURGICAL HISTORY Left 2021    Lumpectomy        Family History   Problem Relation Name Age of Onset    Breast cancer Mother          Allergies   Allergen Reactions    Motrin [Ibuprofen] Hives        Social History     Tobacco Use   Smoking Status Former    Types: Cigarettes    Passive exposure: Never   Smokeless Tobacco Never        Social History     Substance and Sexual Activity   Alcohol Use Not Currently         HOME MEDICATIONS  No current outpatient medications         OBJECTIVE   Last Recorded Vitals.  Blood pressure 135/89, pulse 90, height 1.524 m (5'), weight 73.9 kg (163 lb), SpO2 98%.     PHYSICAL EXAM  Physical Exam   General: Well-developed, well-nourished and in no acute distress. Short stature.  Head: Normocephalic. Atraumatic.  Neck/thyroid: Neck is supple. Normal thyroid without mass. No jugular venous distention.  Eyes: Pupils equal round and reactive to light. Conjunctiva normal.  ENMT: No masses or deformity of external nose. External ears without masses.  Respiratory/Chest: Normal respiratory effort.  Breast: Moderate sized breast. No abnormalities of the right breast. The left breast is significantly swollen with the majority of the swelling in the central aspect. No erythema, but there are skin changes consistent with XRT treatment, but the skin thickness/edema seems slightly decreased compared to a year ago. The lateral breast incision and right axillary incision are both healing well. No drainage, and no palpable mass.  Lymphatics: No palpable lymphadenopathy of the cervical, supraclavicular or axillary regions.  Cardiovascular: Regular rate and rhythm.   Abdomen: Soft, nontender, nondistended. No hernias.   Musculoskeletal: Joints and limbs are grossly normal. Normal gait. Normal range of motion of major joints.  Neuro: Oriented to person, place and time. No obvious neurological deficit. Motor strength grossly normal.  Psych: Normal mood and affect.     RESULTS   Labs  No results found for this or any previous visit (from the past 24 hours).    Radiology Resutls  mammo bilateral screening tomosynthesis  Status: Final result     PACS Images     Show images for BI mammo bilateral screening tomosynthesis  Signed by    Signed Time Phone Pager   Hussein Callejas MD 4/09/2025 16:36 [phone]     Exam Information    Status Exam Begun Exam Ended   Final 4/09/2025 16:09 4/09/2025 16:19     Study  Result    Narrative & Impression   Interpreted By:  Hussein Callejas,   STUDY:  BI MAMMO BILATERAL SCREENING TOMOSYNTHESIS;  4/9/2025 4:19 pm      ACCESSION NUMBER(S):  [id number]      ORDERING CLINICIAN:  CYNDEE MADSEN      INDICATION:  Screening.      ,Z12.31 Encounter for screening mammogram for malignant neoplasm of  breast      COMPARISON:  Multiple prior examinations dating back to 02/24/2022      FINDINGS:  2D and tomosynthesis images were reviewed at 1 mm slice thickness.      Density:  The breasts are extremely dense, which lowers the  sensitivity of mammography.      Skin thickening left breast. Biopsy clips are seen bilaterally.      No suspicious masses or calcifications are identified.      IMPRESSION:  No mammographic evidence of malignancy.      BI-RADS CATEGORY:  BI-RADS Category:  2 Benign.  Recommendation:  Annual Screening.  Recommended Date:  1 Year.  Laterality:  Bilateral.         ASSESSMENT / PLAN   Diagnoses and all orders for this visit:  Malignant neoplasm of upper-outer quadrant of left breast in female, estrogen receptor positive  BRCA negative  Lymphedema of breast  Dense breast tissue  Encounter for screening mammogram for breast cancer  -     BI mammo bilateral screening tomosynthesis; Future        Plan  March 2021: LEFT - Stage Ia, pT1c pN0 cM0; MammaPrint low risks, 0.130; IDC left breast; s/p lumpectomy with SLNB (6 negative nodes); completed XRT; took 1 month of Tamoxifen (stopped Oct 2021); Tamoxifen called in Jan 2022, but not not able to tolerate side effects; refusing further endocrine therapy, but now would like to rediscuss possible tamoxifen usage.     1. The skin changes to the left breast are consistent with XRT changes. She followed up with radiation oncology, but no further intervention or recommendations were made. I referred her to the lymphedema specialist PT/OT to see if massage therapy can assist with drainage of the breast tissue, but she was unable to make  an appointment.  However, she has been doing her own massage therapy and she feels that the lymphedema is somewhat improved.  2. Her genetic testing showed BRCA negative. Her sister also tested negative.  3. Clinically no evidence of recurrent cancer by physical exam or radiographic evaluation.  4. She was only able to tolerate 1 month of her tamoxifen. She was given a new prescription for Tamoxifen (by medical oncology), but never started this medication as she is concerned about some of the side effects. She never followed up with medical oncology as recommended. Taking the endocrine therapy for her ER/AR positive breast cancer was recommended. She noted that her mother is 12 years status post diagnosis of breast cancer without any evidence of recurrence, and she did not take her endocrine therapy either. She stated that she does understand the increased risk of recurrent cancer without this treatment.  She had reconsidered her position on endocrine therapy, and wanted to discuss this further with medical oncology.  She has decided at this point not to take endocrine therapy.  5. She is 4 years out without evidence of recurrent cancer. We will continue to monitor with yearly mammograms and physical exam. Return to the office after next mammogram.       Vicky Roach MD, FACS  [facility]  [address] [place] [address];   [facility] [address]; [address]  [place]  [address] [phone]

## 2025-04-16 NOTE — PATIENT INSTRUCTIONS
1. Continue doing your monthly self breast exams. If you identify any abnormalities, please call Dr. Roach's office immediately for evaluation. [phone]  2.  Continue with your yearly screening mammograms.  Next 1 due in 1 year.  Follow-up in Dr. Roach's office after your mammogram.  3.  Continue doing your massage therapy to your left breast to help with the swelling.

## 2026-04-22 ENCOUNTER — APPOINTMENT (OUTPATIENT)
Dept: SURGERY | Facility: CLINIC | Age: 36
End: 2026-04-22
Payer: COMMERCIAL